# Patient Record
Sex: FEMALE | Race: WHITE | URBAN - METROPOLITAN AREA
[De-identification: names, ages, dates, MRNs, and addresses within clinical notes are randomized per-mention and may not be internally consistent; named-entity substitution may affect disease eponyms.]

---

## 2021-04-05 ENCOUNTER — HISTORICAL (OUTPATIENT)
Dept: ADMINISTRATIVE | Facility: HOSPITAL | Age: 41
End: 2021-04-05

## 2022-04-09 ENCOUNTER — HISTORICAL (OUTPATIENT)
Dept: ADMINISTRATIVE | Facility: HOSPITAL | Age: 42
End: 2022-04-09

## 2022-04-25 VITALS
SYSTOLIC BLOOD PRESSURE: 128 MMHG | WEIGHT: 257.94 LBS | DIASTOLIC BLOOD PRESSURE: 80 MMHG | HEIGHT: 62 IN | OXYGEN SATURATION: 99 % | BODY MASS INDEX: 47.47 KG/M2

## 2022-05-02 NOTE — HISTORICAL [facility]
This is a historical note converted from [employer]. Formatting and pictures may have been removed.  Please reference [employer] for original formatting and attached multimedia. Chief Complaint  lower back x 1 day, no know injury , pain started bending down  History of Present Illness  40-year-old female presents to clinic complaining of?low back pain?for the past 2 days. ?Patient states it all began when she bent down to zipper up her shoes.? Patient states she does have a history of back pain.? Is always in the same area?across the bottom of the back. ?Denies any radiation of the pain. ?Denies any weakness numbness or tingling in the legs. ?Denies any?bladder or bowel dysfunction. ?Denies any trauma injury or fall  Review of Systems  Constitutional: negative except as stated in HPI  ?Eye: negative except as stated in HPI  ?ENT: negative except as stated in HPI  ?Respiratory: negative except as stated in HPI  ?Cardiovascular: negative except as stated in HPI  ?Gastrointestinal: negative except as stated in HPI  ?Genitourinary: negative except as stated in HPI  ?Hema/Lymph: negative except as stated in HPI  ?Endocrine: negative except as stated in HPI  ?Immunologic: negative except as stated in HPI  ?Musculoskeletal: negative except as stated in HPI  ?Integumentary: negative except as stated in HPI  ?Neurologic: negative except as stated in HPI  ?All Other ROS_ negative except as stated in HPI  Physical Exam  Vitals & Measurements  T:?36.5? ?C (Oral)? HR:?101(Peripheral)? RR:?20? BP:?140/84? SpO2:?99%?  HT:?158.00?cm? WT:?116.190?kg? BMI:?46.54?  General_well-developed well-nourished in no acute distress  Musculoskeletal_tenderness to palpation along the?lumbar spine across?the back. ?Hypertonicity of the?paravertebral musculature.? 5 out of 5 strength in the bilateral lower extremities  Integumentary_no rashes or skin lesions present  Neurologic_ cranial nerves intact, no signs of peripheral neurological deficit,  motor/sensory function intact?  ?  Assessment/Plan  1.?Low back pain?M54.5  ?Medications sent to pharmacy.??Start the anti-inflammatory?tonight. ?Muscle relaxer and pain medication may cause drowsiness.? Do not take them at the same time. ?Wait at least 4 hours.? Do not take these medications if you take your Xanax?or sleeping medication temazepam.??Avoid heavy lifting.??Avoid?excessive bending or stooping?or strenuous activities.??Apply warm compresses to the affected area 3-4 times a day for 10 to 15 minutes.??If your symptoms persist or worsen seek medical attention immediately.? discussion was had with patient regarding?narcotic pain medications, muscle relaxers and sedatives.? Patient was advised to?avoid taking her Xanax?if she took the muscle relaxer or pain medication.? Patient was also advised?not to take her?sleeping medication?if she took the muscle relaxer or?pain medication at bedtime.??Discussed the dangers of?taking?pain medications with other sedatives?such as benzodiazepines.? Patient verbalized understanding.  Ordered:  acetaminophen-HYDROcodone, 1 tab(s), Oral, q6hr, PRN PRN as needed for pain, X 3 day(s), # 12 tab(s), 0 Refill(s), Pharmacy: [facility] #[id number], 158, cm, Height/Length Dosing, 04/05/21 8:21:00 CDT, 116.19, kg, Weight Dosing, 04/05/21 8:21:00 CDT  betamethasone, 12 mg, form: Injection, IM, Once-NOW, first dose 04/05/21 9:13:00 CDT, stop date 04/05/21 9:13:00 CDT  diclofenac, 75 mg = 1 tab(s), Oral, BID, # 14 tab(s), 0 Refill(s), Pharmacy: [facility] #[id number], 158, cm, Height/Length Dosing, 04/05/21 8:21:00 CDT, 116.19, kg, Weight Dosing, 04/05/21 8:21:00 CDT  ketorolac, 30 mg, form: Injection, IM, Once, first dose 04/05/21 10:00:00 CDT, stop date 04/05/21 10:00:00 CDT  methocarbamol, 1,500 mg = 2 tab(s), Oral, TID, PRN PRN prn muscle spasm, X 5 day(s), # 30 tab(s), 0 Refill(s), Pharmacy: [id number], 158, cm, Height/Length Dosing, 04/05/21 8:21:00  CDT, 116.19, kg, Weight Dosing, 21 8:21:00 CDT  XR Spine Lumbar 2 or 3 Views, Routine, 21 9:13:00 CDT, None, Ambulatory, Rad Type, Low back pain, Not Scheduled, 21 9:13:00 CDT  ?   Problem List/Past Medical History  Ongoing  Anxiety  Morbid obesity  Historical  No qualifying data  Procedure/Surgical History  wisdom teeth removal (2020)  Bilateral tubal ligation   section   Medications  acetaminophen-hydrocodone 325 mg-5 mg oral tablet, 1 tab(s), Oral, q6hr, PRN  alPRAzolam 0.25 mg oral tab, 0.25 mg= 1 tab(s), Oral, BID  busPIRone 5 mg oral tablet, 5 mg= 1 tab(s), Oral, TID  diclofenac sodium 75 mg oral delayed release tablet, 75 mg= 1 tab(s), Oral, BID  medroxyPROGESTERone 10 mg oral tablet, 10 mg= 1 tab(s), Oral, BID  methocarbamol 750 mg oral tablet, 1500 mg= 2 tab(s), Oral, TID, PRN  temazepam 30 mg oral capsule, 30 mg= 1 cap(s), Oral, qPM  Allergies  No Known Allergies  No Known Medication Allergies  Social History  Abuse/Neglect  No, 2021  No, 2021  Alcohol  Current, 1-2 times per month, 2021  Substance Use  Never, 2021  Tobacco  Never (less than 100 in lifetime), No, 2021  Family History  Family history is negative  Health Maintenance  Health Maintenance  ???Pending?(in the next year)  ??? ??OverDue  ??? ? ? ?Alcohol Misuse Screening due??21??and every 1??year(s)  ??? ??Due?  ??? ? ? ?Cervical Cancer Screening due??02/15/21??and every 3??year(s)  ??? ? ? ?ADL Screening due??21??and every 1??year(s)  ??? ? ? ?Tetanus Vaccine due??21??and every 10??year(s)  ??? ??Due In Future?  ??? ? ? ?Diabetes Screening not due until??21??and every 3??year(s)  ??? ? ? ?Obesity Screening not due until??22??and every 1??year(s)  ??? ? ? ?Depression Screening not due until??22??and every 1??year(s)  ???Satisfied?(in the past 1 year)  ??? ??Satisfied?  ??? ? ? ?Blood Pressure Screening on??21.??Satisfied by GIULIA SANTOS LPN  ALIYAH  ??? ? ? ?Body Mass Index Check on??04/05/21.??Satisfied by Soumya Case LPN  ??? ? ? ?Influenza Vaccine on??01/18/21.??Satisfied by Natalie Blackburn  ??? ? ? ?Obesity Screening on??04/05/21.??Satisfied by Soumya Case LPN  ?  Diagnostic Results  Degenerative changes

## 2024-09-03 ENCOUNTER — PATIENT OUTREACH (OUTPATIENT)
Dept: ADMINISTRATIVE | Facility: HOSPITAL | Age: 44
End: 2024-09-03

## 2024-09-03 LAB
LEFT EYE DM RETINOPATHY: NEGATIVE
RIGHT EYE DM RETINOPATHY: NEGATIVE

## 2024-10-08 ENCOUNTER — PATIENT OUTREACH (OUTPATIENT)
Dept: ADMINISTRATIVE | Facility: HOSPITAL | Age: 44
End: 2024-10-08

## 2024-10-08 LAB
ALBUMIN/CREAT UR: 117.5 UG/MG
GFR ESTIMATION: >60

## 2024-10-13 ENCOUNTER — TELEPHONE (OUTPATIENT)
Dept: PHARMACY | Facility: CLINIC | Age: 44
End: 2024-10-13

## 2024-10-13 RX ORDER — METFORMIN HYDROCHLORIDE 500 MG/1
500 TABLET ORAL 2 TIMES DAILY
COMMUNITY
Start: 2024-09-08

## 2024-10-14 NOTE — TELEPHONE ENCOUNTER
[facility]/Population Health Chart Review & Patient Outreach Details For Medication Adherence Project    Reason for Outreach Encounter: 3rd Party payor non-compliance report ([employer], [employer], [employer], etc)  2.  Patient Outreach Method: Reviewed patient chart   3.   Medication in question:   Diabetes Medications               metFORMIN (GLUCOPHAGE) 500 MG tablet Take 500 mg by mouth 2 (two) times daily.                 METFORMIN  last filled  9/8 for 90 day supply      4.  Reviewed and or Updates Made To: Patient Chart  5. Outreach Outcomes and/or actions taken: Patient filled medication and is on track to be adherent  Additional Notes:

## 2025-01-02 ENCOUNTER — TELEPHONE (OUTPATIENT)
Dept: PHARMACY | Facility: CLINIC | Age: 45
End: 2025-01-02
Payer: COMMERCIAL

## 2025-01-02 NOTE — TELEPHONE ENCOUNTER
[facility]Population [facility] Chart Review & Patient Outreach Details For Medication Adherence Project    Reason for Outreach Encounter: 3rd Party payor non-compliance report ([employer], [employer], [facility], etc)  2.  Patient Outreach Method: Reviewed patient chart  and Telephone call  3.   Medication in question:    Diabetes Medications               metFORMIN (GLUCOPHAGE) 500 MG tablet Take 500 mg by mouth 2 (two) times daily.                 Metformin  last filled  9/8/24 for 90 day supply      4.  Reviewed and or Updates Made To: Patient Chart  5. Outreach Outcomes and/or actions taken: Patient didn't answer. Closed encounter.  Additional Notes:

## 2025-01-04 ENCOUNTER — OFFICE VISIT (OUTPATIENT)
Dept: URGENT CARE | Facility: CLINIC | Age: 45
End: 2025-01-04
Payer: COMMERCIAL

## 2025-01-04 VITALS
SYSTOLIC BLOOD PRESSURE: 120 MMHG | HEIGHT: 62 IN | HEART RATE: 94 BPM | BODY MASS INDEX: 46.01 KG/M2 | TEMPERATURE: 98 F | OXYGEN SATURATION: 98 % | DIASTOLIC BLOOD PRESSURE: 86 MMHG | RESPIRATION RATE: 20 BRPM | WEIGHT: 250 LBS

## 2025-01-04 DIAGNOSIS — R52 BODY ACHES: ICD-10-CM

## 2025-01-04 DIAGNOSIS — R09.81 CONGESTED NOSE: Primary | ICD-10-CM

## 2025-01-04 LAB
CTP QC/QA: YES
POC MOLECULAR INFLUENZA A AGN: NEGATIVE
POC MOLECULAR INFLUENZA B AGN: NEGATIVE

## 2025-01-04 PROCEDURE — 99213 OFFICE O/P EST LOW 20 MIN: CPT | Mod: 25,,, | Performed by: CLINIC/CENTER

## 2025-01-04 PROCEDURE — 96372 THER/PROPH/DIAG INJ SC/IM: CPT | Mod: ,,, | Performed by: CLINIC/CENTER

## 2025-01-04 PROCEDURE — 87502 INFLUENZA DNA AMP PROBE: CPT | Mod: QW,,, | Performed by: CLINIC/CENTER

## 2025-01-04 RX ORDER — BETAMETHASONE SODIUM PHOSPHATE AND BETAMETHASONE ACETATE 3; 3 MG/ML; MG/ML
9 INJECTION, SUSPENSION INTRA-ARTICULAR; INTRALESIONAL; INTRAMUSCULAR; SOFT TISSUE
Status: COMPLETED | OUTPATIENT
Start: 2025-01-04 | End: 2025-01-04

## 2025-01-04 RX ORDER — ESCITALOPRAM OXALATE 10 MG/1
10 TABLET ORAL
COMMUNITY

## 2025-01-04 RX ORDER — LOSARTAN POTASSIUM 50 MG/1
50 TABLET ORAL
COMMUNITY

## 2025-01-04 RX ORDER — HYDROCHLOROTHIAZIDE 25 MG/1
25 TABLET ORAL
COMMUNITY

## 2025-01-04 RX ORDER — FEZOLINETANT 45 MG/1
TABLET, FILM COATED ORAL
COMMUNITY

## 2025-01-04 RX ORDER — ALPRAZOLAM 0.5 MG/1
0.5 TABLET ORAL 2 TIMES DAILY PRN
COMMUNITY

## 2025-01-04 RX ORDER — PANTOPRAZOLE SODIUM 40 MG/1
40 TABLET, DELAYED RELEASE ORAL
COMMUNITY

## 2025-01-04 RX ADMIN — BETAMETHASONE SODIUM PHOSPHATE AND BETAMETHASONE ACETATE 9 MG: 3; 3 INJECTION, SUSPENSION INTRA-ARTICULAR; INTRALESIONAL; INTRAMUSCULAR; SOFT TISSUE at 11:01

## 2025-01-04 NOTE — PROGRESS NOTES
"Subjective:      Patient ID: Elsie Lang is a 44 y.o. female.    Vitals:  height is 5' 2" (1.575 m) and weight is 113.4 kg (250 lb). Her tympanic temperature is 97.8 °F (36.6 °C). Her blood pressure is 120/86 and her pulse is 94. Her respiration is 20 and oxygen saturation is 98%.     Chief Complaint: Nasal Congestion    This is a 44-year-old female presents with a 3 day history of severe nasal congestion, ear pressure, and mild cough.  Patient reports that the cough is somewhat productive with yellow sputum.  Patient denies shortness of breath, sore throat, fever        Respiratory:  Positive for cough.       Objective:     Physical Exam   Constitutional: She is oriented to person, place, and time. She appears well-developed. She is cooperative.  Non-toxic appearance. She does not appear ill. No distress.   HENT:   Head: Normocephalic and atraumatic.   Ears:   Right Ear: Hearing, tympanic membrane, external ear and ear canal normal.   Left Ear: Hearing, tympanic membrane, external ear and ear canal normal.   Nose: Nose normal. No mucosal edema, rhinorrhea or nasal deformity. No epistaxis. Right sinus exhibits no maxillary sinus tenderness and no frontal sinus tenderness. Left sinus exhibits no maxillary sinus tenderness and no frontal sinus tenderness.   Mouth/Throat: Uvula is midline, oropharynx is clear and moist and mucous membranes are normal. No trismus in the jaw. Normal dentition. No uvula swelling. No oropharyngeal exudate, posterior oropharyngeal edema or posterior oropharyngeal erythema.   Eyes: Conjunctivae and lids are normal. No scleral icterus.   Neck: Trachea normal and phonation normal. Neck supple. No edema present. No erythema present. No neck rigidity present.   Cardiovascular: Normal rate, regular rhythm, normal heart sounds and normal pulses.   Pulmonary/Chest: Effort normal and breath sounds normal. No respiratory distress. She has no decreased breath sounds. She has no rhonchi. " "  Abdominal: Normal appearance.   Musculoskeletal: Normal range of motion.         General: No deformity. Normal range of motion.   Neurological: She is alert and oriented to person, place, and time. She exhibits normal muscle tone. Coordination normal.   Skin: Skin is warm, dry, intact, not diaphoretic and not pale.   Psychiatric: Her speech is normal and behavior is normal. Judgment and thought content normal.   Nursing note and vitals reviewed.         Previous History      Review of patient's allergies indicates:  No Known Allergies    Past Medical History:   Diagnosis Date    Anxiety     Diabetes mellitus, type 2     GERD (gastroesophageal reflux disease)     Hypertension      Current Outpatient Medications   Medication Instructions    ALPRAZolam (XANAX) 0.5 mg, 2 times daily PRN    EScitalopram oxalate (LEXAPRO) 10 mg    hydroCHLOROthiazide (HYDRODIURIL) 25 mg    losartan (COZAAR) 50 mg    metFORMIN (GLUCOPHAGE) 500 mg, 2 times daily    pantoprazole (PROTONIX) 40 mg    VEOZAH 45 mg Tab      Past Surgical History:   Procedure Laterality Date     SECTION  2009    HYSTERECTOMY      WISDOM TOOTH EXTRACTION       No family history on file.    Social History     Tobacco Use    Smoking status: Never    Smokeless tobacco: Never        Physical Exam      Vital Signs Reviewed   /86   Pulse 94   Temp 97.8 °F (36.6 °C) (Tympanic)   Resp 20   Ht 5' 2" (1.575 m)   Wt 113.4 kg (250 lb)   SpO2 98%   BMI 45.73 kg/m²        Procedures    Procedures     Labs     Results for orders placed or performed in visit on 25   POCT Influenza A/B Molecular    Collection Time: 25 11:20 AM   Result Value Ref Range    POC Molecular Influenza A Ag Negative Negative    POC Molecular Influenza B Ag Negative Negative     Acceptable Yes       Assessment:     1. Congested nose    2. Body aches        Plan:   Drink plenty of fluids.     Get plenty of rest.     Tylenol or Motrin as needed.     Go to the " ER with any significant change or worsening of symptoms.     Follow up with your primary care doctor.      Congested nose  -     betamethasone acetate-betamethasone sodium phosphate injection 9 mg    Body aches  -     POCT Influenza A/B Molecular  -     betamethasone acetate-betamethasone sodium phosphate injection 9 mg

## 2025-01-08 ENCOUNTER — TELEPHONE (OUTPATIENT)
Dept: PRIMARY CARE CLINIC | Facility: CLINIC | Age: 45
End: 2025-01-08

## 2025-01-08 ENCOUNTER — OFFICE VISIT (OUTPATIENT)
Dept: PRIMARY CARE CLINIC | Facility: CLINIC | Age: 45
End: 2025-01-08
Payer: COMMERCIAL

## 2025-01-08 VITALS
WEIGHT: 247.19 LBS | OXYGEN SATURATION: 98 % | DIASTOLIC BLOOD PRESSURE: 86 MMHG | HEART RATE: 88 BPM | BODY MASS INDEX: 45.49 KG/M2 | HEIGHT: 62 IN | SYSTOLIC BLOOD PRESSURE: 117 MMHG | RESPIRATION RATE: 15 BRPM | TEMPERATURE: 98 F

## 2025-01-08 DIAGNOSIS — E66.01 MORBID OBESITY: ICD-10-CM

## 2025-01-08 DIAGNOSIS — Z00.00 WELLNESS EXAMINATION: ICD-10-CM

## 2025-01-08 DIAGNOSIS — E11.69 TYPE 2 DIABETES MELLITUS WITH OTHER SPECIFIED COMPLICATION, UNSPECIFIED WHETHER LONG TERM INSULIN USE: ICD-10-CM

## 2025-01-08 DIAGNOSIS — I10 PRIMARY HYPERTENSION: Chronic | ICD-10-CM

## 2025-01-08 DIAGNOSIS — Z12.31 ENCOUNTER FOR SCREENING MAMMOGRAM FOR BREAST CANCER: Primary | ICD-10-CM

## 2025-01-08 DIAGNOSIS — Z11.59 ENCOUNTER FOR HEPATITIS C SCREENING TEST FOR LOW RISK PATIENT: ICD-10-CM

## 2025-01-08 DIAGNOSIS — Z76.89 ENCOUNTER TO ESTABLISH CARE WITH NEW DOCTOR: Primary | ICD-10-CM

## 2025-01-08 DIAGNOSIS — Z11.4 ENCOUNTER FOR SCREENING FOR HIV: ICD-10-CM

## 2025-01-08 DIAGNOSIS — E11.65 TYPE 2 DIABETES MELLITUS WITH HYPERGLYCEMIA, WITHOUT LONG-TERM CURRENT USE OF INSULIN: ICD-10-CM

## 2025-01-08 PROBLEM — F90.0 ATTENTION DEFICIT HYPERACTIVITY DISORDER (ADHD), PREDOMINANTLY INATTENTIVE TYPE: Status: ACTIVE | Noted: 2025-01-08

## 2025-01-08 PROBLEM — F90.0 ATTENTION DEFICIT HYPERACTIVITY DISORDER (ADHD), PREDOMINANTLY INATTENTIVE TYPE: Chronic | Status: ACTIVE | Noted: 2025-01-08

## 2025-01-08 PROBLEM — F41.1 GENERALIZED ANXIETY DISORDER: Chronic | Status: ACTIVE | Noted: 2025-01-08

## 2025-01-08 PROBLEM — K21.9 GASTROESOPHAGEAL REFLUX DISEASE: Status: ACTIVE | Noted: 2025-01-08

## 2025-01-08 PROBLEM — F41.1 GENERALIZED ANXIETY DISORDER: Status: ACTIVE | Noted: 2025-01-08

## 2025-01-08 LAB — HBA1C MFR BLD: 10.2 %

## 2025-01-08 PROCEDURE — 83036 HEMOGLOBIN GLYCOSYLATED A1C: CPT | Mod: QW,,, | Performed by: STUDENT IN AN ORGANIZED HEALTH CARE EDUCATION/TRAINING PROGRAM

## 2025-01-08 RX ORDER — EMPAGLIFLOZIN, METFORMIN HYDROCHLORIDE 25; 1000 MG/1; MG/1
1 TABLET, EXTENDED RELEASE ORAL DAILY
Qty: 90 TABLET | Refills: 3 | Status: SHIPPED | OUTPATIENT
Start: 2025-01-08 | End: 2025-01-08 | Stop reason: SDUPTHER

## 2025-01-08 RX ORDER — EMPAGLIFLOZIN, METFORMIN HYDROCHLORIDE 25; 1000 MG/1; MG/1
1 TABLET, EXTENDED RELEASE ORAL DAILY
Qty: 30 TABLET | Refills: 11 | Status: SHIPPED | OUTPATIENT
Start: 2025-01-08 | End: 2026-01-08

## 2025-01-08 NOTE — ASSESSMENT & PLAN NOTE
Most recent A1c was 10.2, goal A1c <7.0%   Start Synjardy XR, stop metformin IR 1000 mg BID  Start Ozempic 0.25 mg weekly, 0.5mg weekly for 4 doses, then 1 mg weekly for 4 doses  Repeat in 3 months adjust medications accordingly  Continue ACE-I and Statin   Continue ADA diet, Counseled on importance of diet & weight loss

## 2025-01-08 NOTE — PROGRESS NOTES
Subjective:       Patient ID: Elsie Lang is a 44 y.o. female.    -------------------------------------    Anxiety    Diabetes mellitus, type 2    GERD (gastroesophageal reflux disease)    Hypertension      Chief Complaint: Establish Care    Presents to the clinic to establish care. Was feeling bad, tired, and lightheaded. Checked her sugar with her mother-in-law glucometer and it was in the 400s. Consistently been in the 200-300s range. Last year her A1c was 6.0, pre-diabetic. On metformin 1000mg BID, admits that she was only taking 1000mg daily due to GI side effects. POC A1c was 10.2.       Review of Systems   Constitutional:  Positive for fatigue. Negative for chills and fever.   Respiratory:  Negative for cough.    Cardiovascular:  Negative for chest pain.   Gastrointestinal:  Negative for abdominal pain and vomiting.   Genitourinary:  Negative for dysuria and hematuria.   Neurological:  Positive for light-headedness.   Psychiatric/Behavioral:  Negative for depressed mood.          Objective:      Physical Exam  Vitals and nursing note reviewed.   Constitutional:       General: She is not in acute distress.     Appearance: Normal appearance. She is not ill-appearing.   HENT:      Head: Normocephalic.      Nose: Nose normal. No rhinorrhea.      Mouth/Throat:      Mouth: Mucous membranes are moist.   Eyes:      General: No scleral icterus.     Conjunctiva/sclera: Conjunctivae normal.   Cardiovascular:      Rate and Rhythm: Normal rate and regular rhythm.   Pulmonary:      Effort: Pulmonary effort is normal. No respiratory distress.   Musculoskeletal:         General: No deformity.      Right lower leg: No edema.      Left lower leg: No edema.   Skin:     General: Skin is warm and dry.      Coloration: Skin is not jaundiced.   Neurological:      Mental Status: She is alert and oriented to person, place, and time. Mental status is at baseline.      Gait: Gait normal.   Psychiatric:         Mood and Affect:  Mood normal.         Behavior: Behavior normal.         Assessment & Plan:   1. Encounter to establish care with new doctor  Comments:  Reviewed medical history and reconciled medications   Ordered Wellness Labs   Requested records from previous provider/specialists  Orders:  -     CBC Auto Differential; Future; Expected date: 04/08/2025  -     Comprehensive Metabolic Panel; Future; Expected date: 04/08/2025  -     Urinalysis; Future; Expected date: 04/08/2025  -     TSH; Future; Expected date: 04/08/2025  -     Lipid Panel; Future; Expected date: 04/08/2025  -     Hemoglobin A1C; Future; Expected date: 04/08/2025    2. Type 2 diabetes mellitus with hyperglycemia, without long-term current use of insulin  Assessment & Plan:  Most recent A1c was 10.2, goal A1c <7.0%   Start Synjardy XR, stop metformin IR 1000 mg BID  Start Ozempic 0.25 mg weekly, 0.5mg weekly for 4 doses, then 1 mg weekly for 4 doses  Repeat in 3 months adjust medications accordingly  Continue ACE-I and Statin   Continue ADA diet, Counseled on importance of diet & weight loss   Orders:  -     semaglutide (OZEMPIC) 1 mg/dose (4 mg/3 mL); Inject 1 mg into the skin every 7 days.  Dispense: 3 mL; Refill: 11  -     empagliflozin-metformin (SYNJARDY XR) 25-1,000 mg TBph; Take 1 tablet by mouth Daily.  Dispense: 30 tablet; Refill: 11  -     CBC Auto Differential; Future; Expected date: 04/08/2025  -     Comprehensive Metabolic Panel; Future; Expected date: 04/08/2025  -     Urinalysis; Future; Expected date: 04/08/2025  -     TSH; Future; Expected date: 04/08/2025  -     Lipid Panel; Future; Expected date: 04/08/2025  -     Hemoglobin A1C; Future; Expected date: 04/08/2025  -     Microalbumin/Creatinine Ratio, Urine; Future; Expected date: 04/08/2025    3. Primary hypertension  Assessment & Plan:  Today's BP WNL  Continue Losartan, HCTZ  Counseled on low sodium diet, exercise, tobacco avoidance, and limiting alcohol intake   Pt. Has home BP cuff, instructed  to measure home BP and report abnormal values     Orders:  -     CBC Auto Differential; Future; Expected date: 04/08/2025  -     Comprehensive Metabolic Panel; Future; Expected date: 04/08/2025  -     Urinalysis; Future; Expected date: 04/08/2025  -     TSH; Future; Expected date: 04/08/2025  -     Lipid Panel; Future; Expected date: 04/08/2025  -     Hemoglobin A1C; Future; Expected date: 04/08/2025    4. Morbid obesity  Assessment & Plan:  Encouraged healthy lifestyle/diet modifications   Exercise 3-5x a week (>30 minutes, including a variety of cardio, weightbearing and lifting exercises)   Eat a well balanced diet comprised of fruit/vegetables, lean protein, and complex carbs  Start Ozempic for both glycemic and weight loss control    Orders:  -     CBC Auto Differential; Future; Expected date: 04/08/2025  -     Comprehensive Metabolic Panel; Future; Expected date: 04/08/2025  -     Urinalysis; Future; Expected date: 04/08/2025  -     TSH; Future; Expected date: 04/08/2025  -     Lipid Panel; Future; Expected date: 04/08/2025  -     Hemoglobin A1C; Future; Expected date: 04/08/2025    5. Wellness examination  -     CBC Auto Differential; Future; Expected date: 04/08/2025  -     Comprehensive Metabolic Panel; Future; Expected date: 04/08/2025  -     Urinalysis; Future; Expected date: 04/08/2025  -     TSH; Future; Expected date: 04/08/2025  -     Lipid Panel; Future; Expected date: 04/08/2025  -     Hemoglobin A1C; Future; Expected date: 04/08/2025  -     Microalbumin/Creatinine Ratio, Urine; Future; Expected date: 04/08/2025  -     HIV 1/2 Ag/Ab (4th Gen); Future; Expected date: 04/08/2025  -     Hepatitis C Antibody; Future; Expected date: 04/08/2025    6. Encounter for screening for HIV  -     HIV 1/2 Ag/Ab (4th Gen); Future; Expected date: 04/08/2025    7. Encounter for hepatitis C screening test for low risk patient  -     Hepatitis C Antibody; Future; Expected date: 04/08/2025    Follow up in about 3 months  (around 4/8/2025) for Wellness, Diabetes, HTN. In addition to their scheduled follow up, the patient has also been instructed to follow up on as needed basis.

## 2025-01-08 NOTE — ASSESSMENT & PLAN NOTE
Encouraged healthy lifestyle/diet modifications   Exercise 3-5x a week (>30 minutes, including a variety of cardio, weightbearing and lifting exercises)   Eat a well balanced diet comprised of fruit/vegetables, lean protein, and complex carbs  Start Ozempic for both glycemic and weight loss control

## 2025-01-08 NOTE — ASSESSMENT & PLAN NOTE
Today's BP WNL  Continue Losartan, HCTZ  Counseled on low sodium diet, exercise, tobacco avoidance, and limiting alcohol intake   Pt. Has home BP cuff, instructed to measure home BP and report abnormal values

## 2025-01-08 NOTE — TELEPHONE ENCOUNTER
----- Message from Lynne sent at 1/8/2025 11:29 AM CST -----  Regarding: pharmacy call  Who Called: Lizzette pharmacist at [facility]     Pharmacy is calling to request assistance with Rx      Pharmacy name and phone number: [facility]  Pharmacy contact:   Patient Name:   Prescription Name: semaglutide (OZEMPIC) 1 mg/dose (4 mg/3 mL   What do they need to clarify?:dose -           Patient's Preferred Phone Number on File: [phone]   Best Call Back Number, if different: [phone]  Additional Information: pt was prescribed medication, pharmacist stated that its a new med for the pt and that it usually starts with a lower dose of 0.25 x week; but 1 mg x week was rx. Need to clarify if pt is to start on the 1 mg x week dose or 0.25 mg

## 2025-01-08 NOTE — TELEPHONE ENCOUNTER
Spoke with Lizzette the pharmacist and she verbalized understanding. She will make a note in the patient's chart.

## 2025-01-08 NOTE — TELEPHONE ENCOUNTER
Patient was given 0.25mg samples and 0.5mg samples in the office. Sent the 1mg to the pharmacy. Patient was informed at visit to start with 0.25 mg weekly, 0.5mg weekly for 4 doses prior to starting her 1mg prescription.     Valery Henry MD

## 2025-02-04 ENCOUNTER — TELEPHONE (OUTPATIENT)
Dept: PRIMARY CARE CLINIC | Facility: CLINIC | Age: 45
End: 2025-02-04
Payer: COMMERCIAL

## 2025-02-04 DIAGNOSIS — E11.65 TYPE 2 DIABETES MELLITUS WITH HYPERGLYCEMIA, WITHOUT LONG-TERM CURRENT USE OF INSULIN: Primary | Chronic | ICD-10-CM

## 2025-02-04 RX ORDER — METFORMIN HYDROCHLORIDE EXTENDED-RELEASE TABLETS 1000 MG/1
1000 TABLET, FILM COATED, EXTENDED RELEASE ORAL
Qty: 90 TABLET | Refills: 3 | Status: SHIPPED | OUTPATIENT
Start: 2025-02-04 | End: 2026-02-04

## 2025-02-04 NOTE — TELEPHONE ENCOUNTER
Spoke with Pt about options regarding her SYNJARDY  mg costs and alternatives, let Pt know that I will be putting in a order for metformin and jardiance as separate medications.

## 2025-02-04 NOTE — TELEPHONE ENCOUNTER
----- Message from Tegan sent at 2/3/2025 12:25 PM CST -----  Who Called: Elsie Lang    Caller is requesting assistance/information from provider's office.    Symptoms (please be specific): n/a   How long has patient had these symptoms:  n/a  List of preferred pharmacies on file (remove unneeded): [unfilled]  If different, enter pharmacy into here including location and phone number: n/a      Preferred Method of Contact: Phone Call  Patient's Preferred Phone Number on File: [phone]   Best Call Back Number, if different:  Additional Information: pt advise, pt called to discuss medication:  empagliflozin-metformin (SYNJARDY XR) 25-1,000 mg TBph 30 tablet, pt stated medication is too costly over $1,000.00 for 3m supply, please advise, thanks

## 2025-02-06 ENCOUNTER — TELEPHONE (OUTPATIENT)
Dept: PRIMARY CARE CLINIC | Facility: CLINIC | Age: 45
End: 2025-02-06
Payer: COMMERCIAL

## 2025-02-06 DIAGNOSIS — E11.65 TYPE 2 DIABETES MELLITUS WITH HYPERGLYCEMIA, WITHOUT LONG-TERM CURRENT USE OF INSULIN: Primary | ICD-10-CM

## 2025-02-06 RX ORDER — GLIMEPIRIDE 4 MG/1
4 TABLET ORAL 2 TIMES DAILY
Qty: 180 TABLET | Refills: 3 | Status: SHIPPED | OUTPATIENT
Start: 2025-02-06 | End: 2026-02-06

## 2025-02-06 NOTE — TELEPHONE ENCOUNTER
----- Message from Rupa sent at 2/5/2025  9:51 AM CST -----  Who Called: Candice    Caller is requesting assistance/information from provider's office.    Symptoms (please be specific): n/a   How long has patient had these symptoms:  n/a  List of preferred pharmacies on file (remove unneeded): [facility] - [place], [place] - [address]        Preferred Method of Contact: Phone Call  Patient's Preferred Phone Number on File: [phone]   Best Call Back Number, if different:[phone]  Additional Information: Would like to change metFORMIN (FORTAMET) 1,000 mg 24hr tablet to metformin  mg and have pt take two tablets once a day.

## 2025-02-06 NOTE — TELEPHONE ENCOUNTER
Spoke with Lizzette from [facility] pharmacy and gave her a verbal on changing the patient's metformin to 500 mg, taking 2 tablets a day before breakfast.

## 2025-02-06 NOTE — TELEPHONE ENCOUNTER
Spoke with the patient and notified her in place of the Jardiance we sent in Amaryl 4 mg to take BID. Patient verbalized understanding.

## 2025-02-06 NOTE — TELEPHONE ENCOUNTER
----- Message from Brianne sent at 2/5/2025  3:36 PM CST -----  Who Called: Elsie Lang    Caller is requesting assistance/information from provider's office.    Symptoms (please be specific):    How long has patient had these symptoms:    List of preferred pharmacies on file (remove unneeded): [unfilled]  If different, enter pharmacy into here including location and phone number:       Preferred Method of Contact: Phone Call  Patient's Preferred Phone Number on File: [phone]   Best Call Back Number, if different:  Additional Information: Pt is requesting a cb regarding medications that were called into pharmacy yesterday states medication is too expensive.

## 2025-03-05 ENCOUNTER — TELEPHONE (OUTPATIENT)
Dept: PRIMARY CARE CLINIC | Facility: CLINIC | Age: 45
End: 2025-03-05
Payer: COMMERCIAL

## 2025-03-05 NOTE — TELEPHONE ENCOUNTER
Called Pt to get more information about new side effects from OZEMPIC increase LVM as there was no answer.

## 2025-03-05 NOTE — TELEPHONE ENCOUNTER
----- Message from Susi sent at 3/5/2025 10:18 AM CST -----  Regarding: call back  .Who Called: Elsie Kathleen is requesting assistance/information from provider's office.Symptoms (please be specific):  How long has patient had these symptoms:  List of preferred pharmacies on file (remove unneeded): [unfilled]If different, enter pharmacy into here including location and phone number: Preferred Method of Contact: Phone CallPatient's Preferred Phone Number on File: [phone] Best Call Back Number, if different:Additional Information: pt requesting a call back about med  semaglutide (OZEMPIC) 1 mg/dose (4 mg/3 mL) her dose increased and her lows are low needs advice

## 2025-03-07 ENCOUNTER — TELEPHONE (OUTPATIENT)
Dept: PRIMARY CARE CLINIC | Facility: CLINIC | Age: 45
End: 2025-03-07
Payer: COMMERCIAL

## 2025-03-07 DIAGNOSIS — R11.2 NAUSEA AND VOMITING, UNSPECIFIED VOMITING TYPE: Primary | ICD-10-CM

## 2025-03-07 RX ORDER — ONDANSETRON 4 MG/1
TABLET, ORALLY DISINTEGRATING ORAL
Qty: 60 TABLET | Refills: 1 | Status: SHIPPED | OUTPATIENT
Start: 2025-03-07

## 2025-03-07 NOTE — TELEPHONE ENCOUNTER
Spoke with pt in regards to side effects and blood sugar dropping, pt verbalized she was eating with Amaryl (protein shake and sausage marli), educated pt that at every increase of medication injection GI and Nausea can worsen do the body adjusting and that food needs to be taken with Amaryl and that what she ate may not of been enough for the medication Pt verbalized understanding. Scheduled a A1C check on 3/10/25. Pt confirmed. Reminded pt that last A1c was 10.2 in January and the goal is 7 or lower. As well as 100 or lower for fasting blood sugar test in the morning. Will send in zofran odt for nausea and vomiting. Will send [employer] message about if sugar continue to drop with increase in food that Dr. Hnery okayed to cut Amaryl 4mg in half and to take 2 mg BID.

## 2025-03-10 ENCOUNTER — TELEPHONE (OUTPATIENT)
Dept: PRIMARY CARE CLINIC | Facility: CLINIC | Age: 45
End: 2025-03-10

## 2025-03-10 ENCOUNTER — CLINICAL SUPPORT (OUTPATIENT)
Dept: PRIMARY CARE CLINIC | Facility: CLINIC | Age: 45
End: 2025-03-10
Payer: COMMERCIAL

## 2025-03-10 DIAGNOSIS — E11.65 TYPE 2 DIABETES MELLITUS WITH HYPERGLYCEMIA, WITHOUT LONG-TERM CURRENT USE OF INSULIN: Primary | Chronic | ICD-10-CM

## 2025-03-10 LAB — HBA1C MFR BLD: 7 %

## 2025-03-10 NOTE — TELEPHONE ENCOUNTER
Spoke with Pt regarding the results of her A1c Nurse Visit which resulted as Hemoglobin A1c of 7.0%. Per Dr. Henry sghe recommends a dose adjustment on the Amarly to go down to a half tablets (2 mg) BID. Pt verbalized Understanding.

## 2025-03-10 NOTE — PROGRESS NOTES
Pt came into clinic for A1c Check. No changs to medications, medical history. No comments or concerns from previous complaint which prompted the nurse visit (dizziness, nausea, light-headedness)  POCT A1c order was placed and sample was taken. Pt verbalized understanding and thanks.

## 2025-04-04 ENCOUNTER — RESULTS FOLLOW-UP (OUTPATIENT)
Dept: PRIMARY CARE CLINIC | Facility: CLINIC | Age: 45
End: 2025-04-04

## 2025-04-10 DIAGNOSIS — F41.1 GENERALIZED ANXIETY DISORDER: Primary | Chronic | ICD-10-CM

## 2025-04-10 DIAGNOSIS — K21.9 GASTROESOPHAGEAL REFLUX DISEASE, UNSPECIFIED WHETHER ESOPHAGITIS PRESENT: ICD-10-CM

## 2025-04-10 RX ORDER — PANTOPRAZOLE SODIUM 40 MG/1
40 TABLET, DELAYED RELEASE ORAL DAILY
Qty: 90 TABLET | Refills: 1 | Status: SHIPPED | OUTPATIENT
Start: 2025-04-10

## 2025-04-10 RX ORDER — ESCITALOPRAM OXALATE 10 MG/1
10 TABLET ORAL DAILY
Qty: 90 TABLET | Refills: 1 | Status: SHIPPED | OUTPATIENT
Start: 2025-04-10

## 2025-04-17 ENCOUNTER — LAB VISIT (OUTPATIENT)
Dept: LAB | Facility: HOSPITAL | Age: 45
End: 2025-04-17
Attending: STUDENT IN AN ORGANIZED HEALTH CARE EDUCATION/TRAINING PROGRAM
Payer: COMMERCIAL

## 2025-04-17 DIAGNOSIS — I10 PRIMARY HYPERTENSION: ICD-10-CM

## 2025-04-17 DIAGNOSIS — Z11.4 ENCOUNTER FOR SCREENING FOR HIV: ICD-10-CM

## 2025-04-17 DIAGNOSIS — E11.65 TYPE 2 DIABETES MELLITUS WITH HYPERGLYCEMIA, WITHOUT LONG-TERM CURRENT USE OF INSULIN: ICD-10-CM

## 2025-04-17 DIAGNOSIS — Z76.89 ENCOUNTER TO ESTABLISH CARE WITH NEW DOCTOR: ICD-10-CM

## 2025-04-17 DIAGNOSIS — Z11.59 ENCOUNTER FOR HEPATITIS C SCREENING TEST FOR LOW RISK PATIENT: ICD-10-CM

## 2025-04-17 DIAGNOSIS — Z00.00 WELLNESS EXAMINATION: ICD-10-CM

## 2025-04-17 DIAGNOSIS — E66.01 MORBID OBESITY: ICD-10-CM

## 2025-04-17 LAB
ALBUMIN SERPL-MCNC: 3.9 G/DL (ref 3.5–5)
ALBUMIN/GLOB SERPL: 1.1 RATIO (ref 1.1–2)
ALP SERPL-CCNC: 89 UNIT/L (ref 40–150)
ALT SERPL-CCNC: 39 UNIT/L (ref 0–55)
ANION GAP SERPL CALC-SCNC: 6 MEQ/L
AST SERPL-CCNC: 27 UNIT/L (ref 11–45)
BASOPHILS # BLD AUTO: 0.03 X10(3)/MCL
BASOPHILS NFR BLD AUTO: 0.4 %
BILIRUB SERPL-MCNC: 0.5 MG/DL
BUN SERPL-MCNC: 17.9 MG/DL (ref 7–18.7)
CALCIUM SERPL-MCNC: 9.7 MG/DL (ref 8.4–10.2)
CHLORIDE SERPL-SCNC: 101 MMOL/L (ref 98–107)
CHOLEST SERPL-MCNC: 175 MG/DL
CHOLEST/HDLC SERPL: 5 {RATIO} (ref 0–5)
CO2 SERPL-SCNC: 33 MMOL/L (ref 22–29)
CREAT SERPL-MCNC: 0.93 MG/DL (ref 0.55–1.02)
CREAT/UREA NIT SERPL: 19
EOSINOPHIL # BLD AUTO: 0.07 X10(3)/MCL (ref 0–0.9)
EOSINOPHIL NFR BLD AUTO: 0.9 %
ERYTHROCYTE [DISTWIDTH] IN BLOOD BY AUTOMATED COUNT: 13.8 % (ref 11.5–17)
EST. AVERAGE GLUCOSE BLD GHB EST-MCNC: 111.2 MG/DL
GFR SERPLBLD CREATININE-BSD FMLA CKD-EPI: >60 ML/MIN/1.73/M2
GLOBULIN SER-MCNC: 3.5 GM/DL (ref 2.4–3.5)
GLUCOSE SERPL-MCNC: 95 MG/DL (ref 74–100)
HBA1C MFR BLD: 5.5 %
HCT VFR BLD AUTO: 42.2 % (ref 37–47)
HDLC SERPL-MCNC: 38 MG/DL (ref 35–60)
HGB BLD-MCNC: 13.5 G/DL (ref 12–16)
IMM GRANULOCYTES # BLD AUTO: 0.02 X10(3)/MCL (ref 0–0.04)
IMM GRANULOCYTES NFR BLD AUTO: 0.3 %
LDLC SERPL CALC-MCNC: 101 MG/DL (ref 50–140)
LYMPHOCYTES # BLD AUTO: 2.67 X10(3)/MCL (ref 0.6–4.6)
LYMPHOCYTES NFR BLD AUTO: 35.4 %
MCH RBC QN AUTO: 30 PG (ref 27–31)
MCHC RBC AUTO-ENTMCNC: 32 G/DL (ref 33–36)
MCV RBC AUTO: 93.8 FL (ref 80–94)
MONOCYTES # BLD AUTO: 0.6 X10(3)/MCL (ref 0.1–1.3)
MONOCYTES NFR BLD AUTO: 7.9 %
NEUTROPHILS # BLD AUTO: 4.16 X10(3)/MCL (ref 2.1–9.2)
NEUTROPHILS NFR BLD AUTO: 55.1 %
NRBC BLD AUTO-RTO: 0 %
PLATELET # BLD AUTO: 260 X10(3)/MCL (ref 130–400)
PMV BLD AUTO: 10.2 FL (ref 7.4–10.4)
POTASSIUM SERPL-SCNC: 4.2 MMOL/L (ref 3.5–5.1)
PROT SERPL-MCNC: 7.4 GM/DL (ref 6.4–8.3)
RBC # BLD AUTO: 4.5 X10(6)/MCL (ref 4.2–5.4)
SODIUM SERPL-SCNC: 140 MMOL/L (ref 136–145)
TRIGL SERPL-MCNC: 182 MG/DL (ref 37–140)
TSH SERPL-ACNC: 1.01 UIU/ML (ref 0.35–4.94)
VLDLC SERPL CALC-MCNC: 36 MG/DL
WBC # BLD AUTO: 7.55 X10(3)/MCL (ref 4.5–11.5)

## 2025-04-17 PROCEDURE — 87389 HIV-1 AG W/HIV-1&-2 AB AG IA: CPT

## 2025-04-17 PROCEDURE — 85025 COMPLETE CBC W/AUTO DIFF WBC: CPT

## 2025-04-17 PROCEDURE — 83036 HEMOGLOBIN GLYCOSYLATED A1C: CPT

## 2025-04-17 PROCEDURE — 84443 ASSAY THYROID STIM HORMONE: CPT

## 2025-04-17 PROCEDURE — 36415 COLL VENOUS BLD VENIPUNCTURE: CPT

## 2025-04-17 PROCEDURE — 86803 HEPATITIS C AB TEST: CPT

## 2025-04-17 PROCEDURE — 80053 COMPREHEN METABOLIC PANEL: CPT

## 2025-04-17 PROCEDURE — 80061 LIPID PANEL: CPT

## 2025-04-18 LAB
HCV AB SERPL QL IA: NONREACTIVE
HIV 1+2 AB+HIV1 P24 AG SERPL QL IA: NONREACTIVE

## 2025-04-20 ENCOUNTER — RESULTS FOLLOW-UP (OUTPATIENT)
Dept: PRIMARY CARE CLINIC | Facility: CLINIC | Age: 45
End: 2025-04-20

## 2025-04-21 ENCOUNTER — RESULTS FOLLOW-UP (OUTPATIENT)
Dept: PRIMARY CARE CLINIC | Facility: CLINIC | Age: 45
End: 2025-04-21

## 2025-04-21 DIAGNOSIS — Z00.00 WELL ADULT EXAM: Primary | ICD-10-CM

## 2025-04-21 DIAGNOSIS — E11.65 TYPE 2 DIABETES MELLITUS WITH HYPERGLYCEMIA, WITHOUT LONG-TERM CURRENT USE OF INSULIN: Chronic | ICD-10-CM

## 2025-04-24 ENCOUNTER — OFFICE VISIT (OUTPATIENT)
Dept: PRIMARY CARE CLINIC | Facility: CLINIC | Age: 45
End: 2025-04-24
Payer: COMMERCIAL

## 2025-04-24 VITALS
HEIGHT: 62 IN | HEART RATE: 84 BPM | BODY MASS INDEX: 40.48 KG/M2 | OXYGEN SATURATION: 98 % | WEIGHT: 220 LBS | SYSTOLIC BLOOD PRESSURE: 115 MMHG | RESPIRATION RATE: 15 BRPM | DIASTOLIC BLOOD PRESSURE: 80 MMHG

## 2025-04-24 DIAGNOSIS — E11.9 TYPE 2 DIABETES MELLITUS WITHOUT COMPLICATION, WITHOUT LONG-TERM CURRENT USE OF INSULIN: ICD-10-CM

## 2025-04-24 DIAGNOSIS — R11.2 NAUSEA AND VOMITING, UNSPECIFIED VOMITING TYPE: ICD-10-CM

## 2025-04-24 DIAGNOSIS — I10 PRIMARY HYPERTENSION: Chronic | ICD-10-CM

## 2025-04-24 DIAGNOSIS — E66.01 MORBID OBESITY: Chronic | ICD-10-CM

## 2025-04-24 DIAGNOSIS — Z00.00 WELLNESS EXAMINATION: Primary | Chronic | ICD-10-CM

## 2025-04-24 PROCEDURE — 3074F SYST BP LT 130 MM HG: CPT | Mod: CPTII,,, | Performed by: STUDENT IN AN ORGANIZED HEALTH CARE EDUCATION/TRAINING PROGRAM

## 2025-04-24 PROCEDURE — 4010F ACE/ARB THERAPY RXD/TAKEN: CPT | Mod: CPTII,,, | Performed by: STUDENT IN AN ORGANIZED HEALTH CARE EDUCATION/TRAINING PROGRAM

## 2025-04-24 PROCEDURE — 3044F HG A1C LEVEL LT 7.0%: CPT | Mod: CPTII,,, | Performed by: STUDENT IN AN ORGANIZED HEALTH CARE EDUCATION/TRAINING PROGRAM

## 2025-04-24 PROCEDURE — 99396 PREV VISIT EST AGE 40-64: CPT | Mod: ,,, | Performed by: STUDENT IN AN ORGANIZED HEALTH CARE EDUCATION/TRAINING PROGRAM

## 2025-04-24 PROCEDURE — 3008F BODY MASS INDEX DOCD: CPT | Mod: CPTII,,, | Performed by: STUDENT IN AN ORGANIZED HEALTH CARE EDUCATION/TRAINING PROGRAM

## 2025-04-24 PROCEDURE — 3060F POS MICROALBUMINURIA REV: CPT | Mod: CPTII,,, | Performed by: STUDENT IN AN ORGANIZED HEALTH CARE EDUCATION/TRAINING PROGRAM

## 2025-04-24 PROCEDURE — 1160F RVW MEDS BY RX/DR IN RCRD: CPT | Mod: CPTII,,, | Performed by: STUDENT IN AN ORGANIZED HEALTH CARE EDUCATION/TRAINING PROGRAM

## 2025-04-24 PROCEDURE — 1159F MED LIST DOCD IN RCRD: CPT | Mod: CPTII,,, | Performed by: STUDENT IN AN ORGANIZED HEALTH CARE EDUCATION/TRAINING PROGRAM

## 2025-04-24 PROCEDURE — 3079F DIAST BP 80-89 MM HG: CPT | Mod: CPTII,,, | Performed by: STUDENT IN AN ORGANIZED HEALTH CARE EDUCATION/TRAINING PROGRAM

## 2025-04-24 PROCEDURE — 3066F NEPHROPATHY DOC TX: CPT | Mod: CPTII,,, | Performed by: STUDENT IN AN ORGANIZED HEALTH CARE EDUCATION/TRAINING PROGRAM

## 2025-04-24 RX ORDER — ROSUVASTATIN CALCIUM 5 MG/1
5 TABLET, COATED ORAL DAILY
Qty: 90 TABLET | Refills: 3 | Status: SHIPPED | OUTPATIENT
Start: 2025-04-24 | End: 2026-04-24

## 2025-04-24 RX ORDER — TIRZEPATIDE 5 MG/.5ML
5 INJECTION, SOLUTION SUBCUTANEOUS
Qty: 2 ML | Refills: 0 | Status: SHIPPED | OUTPATIENT
Start: 2025-04-24 | End: 2025-05-24

## 2025-04-24 RX ORDER — ONDANSETRON 4 MG/1
TABLET, ORALLY DISINTEGRATING ORAL
Qty: 60 TABLET | Refills: 1 | Status: SHIPPED | OUTPATIENT
Start: 2025-04-24

## 2025-04-24 RX ORDER — GLIMEPIRIDE 2 MG/1
2 TABLET ORAL
Qty: 90 TABLET | Refills: 3 | Status: SHIPPED | OUTPATIENT
Start: 2025-04-24 | End: 2026-04-24

## 2025-04-24 RX ORDER — METFORMIN HYDROCHLORIDE 500 MG/1
1000 TABLET, EXTENDED RELEASE ORAL EVERY MORNING
COMMUNITY
Start: 2025-02-06

## 2025-04-24 NOTE — ASSESSMENT & PLAN NOTE
Health Maintenance:  Routine Health Maintenance   Exercise as tolerated, >30 minutes a day for 3-5 days a week.  Counseled patient on compliance with medications and healthy diet.     Age-Appropriate Screening  Vaccinations:    - Flu: Out of season, postponed, patient's preference    - Pneumonia: N/A   - Shingles (>50):N/A   - Tdap:UTD, due 2029   - COVID: Discontinued, patient's preference     Screening:   - Pap Smear (21-65):Hysterectomy   - Mammogram (40-50 discuss w/ pt, all >50):UTD, due in March    - Osteoporosis (all>65, sooner if risk factors):N/A   - Lung Ca. Screening (50-80 if >20 pack yrs current or quit <15 yrs):N/A   - Colon Ca. Screening (age >45): N/A   - Hep. C, HIV: Negative

## 2025-04-24 NOTE — PROGRESS NOTES
ANNUAL WELLNESS NOTE    Chief Complaint:  Wellness     HPI:  Elsie Lang is a 44 y.o. female    -------------------------------------    Anxiety    Diabetes mellitus, type 2    GERD (gastroesophageal reflux disease)    Hypertension     Patient Care Team:  Valery Henry MD as PCP - General (Internal Medicine)  Duglas Melvin MD (Obstetrics and Gynecology)    Presents today for wellness visit. Describes overall health as good. Diet is not healthy - work in progress. Exercises 3-4 times per week. Tobacco use: never. Alcohol use: rare (special occasion). Caffeine intake: 1 caffeinated drink daily. Eye exam: annually (wears glasses). Dental exam: twice annually. Having nausea/vomiting 12-24 hours after taking the Ozempic. Affecting her job because she has had to call in sick so many times recently. No further hypoglycemic episodes.  Would like to see if Mounjaro is better tolerated. Reviewed labs, answered all questions and concerns at this time.     Review of Systems   Constitutional:  Negative for chills and fever.   Respiratory:  Negative for cough.    Cardiovascular:  Negative for chest pain.   Gastrointestinal:  Positive for nausea and vomiting. Negative for abdominal pain.   Genitourinary:  Negative for dysuria and hematuria.   Psychiatric/Behavioral:  Negative for dysphoric mood.      Physical Exam  Vitals and nursing note reviewed.   Constitutional:       General: She is not in acute distress.     Appearance: Normal appearance. She is not ill-appearing.   HENT:      Head: Normocephalic.      Nose: Nose normal. No rhinorrhea.      Mouth/Throat:      Mouth: Mucous membranes are moist.   Eyes:      General: No scleral icterus.     Conjunctiva/sclera: Conjunctivae normal.   Cardiovascular:      Rate and Rhythm: Normal rate and regular rhythm.   Pulmonary:      Effort: Pulmonary effort is normal. No respiratory distress.   Musculoskeletal:         General: No deformity.   Skin:     General:  Skin is warm and dry.      Coloration: Skin is not jaundiced.   Neurological:      Mental Status: She is alert and oriented to person, place, and time. Mental status is at baseline.      Gait: Gait normal.   Psychiatric:         Mood and Affect: Mood normal.         Behavior: Behavior normal.       Assessment/Plan:  1. Wellness examination  Assessment & Plan:  Health Maintenance:  Routine Health Maintenance   Exercise as tolerated, >30 minutes a day for 3-5 days a week.  Counseled patient on compliance with medications and healthy diet.     Age-Appropriate Screening  Vaccinations:    - Flu: Out of season, postponed, patient's preference    - Pneumonia: N/A   - Shingles (>50):N/A   - Tdap:UTD, due 2029   - COVID: Discontinued, patient's preference     Screening:   - Pap Smear (21-65):Hysterectomy   - Mammogram (40-50 discuss w/ pt, all >50):UTD, due in March    - Osteoporosis (all>65, sooner if risk factors):N/A   - Lung Ca. Screening (50-80 if >20 pack yrs current or quit <15 yrs):N/A   - Colon Ca. Screening (age >45): N/A   - Hep. C, HIV: Negative       2. Type 2 diabetes mellitus without complication, without long-term current use of insulin  Overview:  Ozempic - nausea/vomiting  Jardiance - too expensive     Assessment & Plan:  Most recent A1c was 5.5, goal A1c <7.0%   Continue Metformin   Stop Ozempic due to side effects.   Skip dose on Sunday evening, then next Friday (5/2) start Mounjaro 2.5mg at night   Decrease Amaryl to 2mg daily  Continue ACE-I and Statin   Continue ADA diet, Counseled on importance of diet & weight loss       Orders:  -     tirzepatide (MOUNJARO) 5 mg/0.5 mL PnIj; Inject 5 mg into the skin every 7 days.  Dispense: 2 mL; Refill: 0  -     glimepiride (AMARYL) 2 MG tablet; Take 1 tablet (2 mg total) by mouth before breakfast.  Dispense: 90 tablet; Refill: 3  -     rosuvastatin (CRESTOR) 5 MG tablet; Take 1 tablet (5 mg total) by mouth once daily.  Dispense: 90 tablet; Refill: 3    3. Nausea  and vomiting, unspecified vomiting type  -     ondansetron (ZOFRAN-ODT) 4 MG TbDL; Place 1-2 tablet(s) under the tongue as needed every 8 hours for nausea.  Dispense: 60 tablet; Refill: 1    4. Primary hypertension  Assessment & Plan:  Today's BP WNL  Continue Losartan, HCTZ  Counseled on low sodium diet, exercise, tobacco avoidance, and limiting alcohol intake   Pt. Has home BP cuff, instructed to measure home BP and report abnormal values       5. Morbid obesity  Assessment & Plan:  Encouraged healthy lifestyle/diet modifications   Exercise 3-5x a week (>30 minutes, including a variety of cardio, weightbearing and lifting exercises)   Eat a well balanced diet comprised of fruit/vegetables, lean protein, and complex carbs  Stopped Ozempic due to side effects   Start Mounjaro for both glycemic and weight loss control      Follow up in about 3 months (around 7/24/2025) for Diabetes.

## 2025-04-24 NOTE — ASSESSMENT & PLAN NOTE
Most recent A1c was 5.5, goal A1c <7.0%   Continue Metformin   Stop Ozempic due to side effects.   Skip dose on Sunday evening, then next Friday (5/2) start Mounjaro 2.5mg at night   Decrease Amaryl to 2mg daily  Continue ACE-I and Statin   Continue ADA diet, Counseled on importance of diet & weight loss

## 2025-04-24 NOTE — ASSESSMENT & PLAN NOTE
Encouraged healthy lifestyle/diet modifications   Exercise 3-5x a week (>30 minutes, including a variety of cardio, weightbearing and lifting exercises)   Eat a well balanced diet comprised of fruit/vegetables, lean protein, and complex carbs  Stopped Ozempic due to side effects   Start Mounjaro for both glycemic and weight loss control

## 2025-05-29 ENCOUNTER — TELEPHONE (OUTPATIENT)
Dept: PRIMARY CARE CLINIC | Facility: CLINIC | Age: 45
End: 2025-05-29
Payer: COMMERCIAL

## 2025-05-29 DIAGNOSIS — E11.9 TYPE 2 DIABETES MELLITUS WITHOUT COMPLICATION, WITHOUT LONG-TERM CURRENT USE OF INSULIN: Primary | Chronic | ICD-10-CM

## 2025-05-29 RX ORDER — SEMAGLUTIDE 0.68 MG/ML
0.5 INJECTION, SOLUTION SUBCUTANEOUS
Qty: 3 ML | Refills: 0 | Status: SHIPPED | OUTPATIENT
Start: 2025-05-29

## 2025-05-29 NOTE — TELEPHONE ENCOUNTER
Patient stated she can't not afford the manjouro due, would like to switch back to ozempic. When asked pt stated her insurance stated she still has to pay some of her deductible which is why the medication is still $800 dollars.   Will alert provider.    Copied from CRM #[id number]. Topic: Medications - Medication Question  >> May 28, 2025 11:24 AM Esteban wrote:  .Who Called: Elsie Lang    Caller is requesting assistance/information from provider's office.    Symptoms (please be specific): n/a   How long has patient had these symptoms:  n/a  List of preferred pharmacies on file (remove unneeded): [unfilled]  If different, enter pharmacy into here including location and phone number: n/a      Preferred Method of Contact: Phone Call  Patient's Preferred Phone Number on File: [phone]   Best Call Back Number, if different:  Additional Information: pt called requesting to speak with nurse in regards to medication mounjaro  due to cost

## 2025-07-17 ENCOUNTER — TELEPHONE (OUTPATIENT)
Dept: PRIMARY CARE CLINIC | Facility: CLINIC | Age: 45
End: 2025-07-17
Payer: COMMERCIAL

## 2025-07-21 DIAGNOSIS — R11.2 NAUSEA AND VOMITING, UNSPECIFIED VOMITING TYPE: ICD-10-CM

## 2025-07-21 RX ORDER — ONDANSETRON 4 MG/1
TABLET, ORALLY DISINTEGRATING ORAL
Qty: 60 TABLET | Refills: 0 | Status: SHIPPED | OUTPATIENT
Start: 2025-07-21

## 2025-07-25 ENCOUNTER — OFFICE VISIT (OUTPATIENT)
Dept: PRIMARY CARE CLINIC | Facility: CLINIC | Age: 45
End: 2025-07-25
Payer: COMMERCIAL

## 2025-07-25 VITALS
TEMPERATURE: 98 F | SYSTOLIC BLOOD PRESSURE: 116 MMHG | DIASTOLIC BLOOD PRESSURE: 78 MMHG | WEIGHT: 214 LBS | HEIGHT: 60 IN | BODY MASS INDEX: 42.01 KG/M2 | HEART RATE: 80 BPM | OXYGEN SATURATION: 96 %

## 2025-07-25 DIAGNOSIS — I10 PRIMARY HYPERTENSION: Chronic | ICD-10-CM

## 2025-07-25 DIAGNOSIS — E11.9 TYPE 2 DIABETES MELLITUS WITHOUT COMPLICATION, WITHOUT LONG-TERM CURRENT USE OF INSULIN: Primary | Chronic | ICD-10-CM

## 2025-07-25 DIAGNOSIS — F41.1 GENERALIZED ANXIETY DISORDER: Chronic | ICD-10-CM

## 2025-07-25 RX ORDER — LOSARTAN POTASSIUM 50 MG/1
50 TABLET ORAL DAILY
Qty: 90 TABLET | Refills: 3 | Status: SHIPPED | OUTPATIENT
Start: 2025-07-25

## 2025-07-25 RX ORDER — ESCITALOPRAM OXALATE 10 MG/1
10 TABLET ORAL DAILY
Qty: 90 TABLET | Refills: 1 | Status: SHIPPED | OUTPATIENT
Start: 2025-07-25

## 2025-07-25 RX ORDER — HYDROCHLOROTHIAZIDE 25 MG/1
25 TABLET ORAL DAILY
Qty: 90 TABLET | Refills: 3 | Status: SHIPPED | OUTPATIENT
Start: 2025-07-25 | End: 2026-07-25

## 2025-07-25 RX ORDER — METFORMIN HYDROCHLORIDE 500 MG/1
1000 TABLET, EXTENDED RELEASE ORAL EVERY MORNING
Qty: 180 TABLET | Refills: 3 | Status: SHIPPED | OUTPATIENT
Start: 2025-07-25

## 2025-07-25 NOTE — PROGRESS NOTES
Subjective:       Patient ID: Elsie Lang is a 44 y.o. female.    -------------------------------------    Anxiety    Diabetes mellitus, type 2    GERD (gastroesophageal reflux disease)    Hypertension      Chief Complaint: Diabetes (F/u of diabetes. She notes having the shakes from hypoglycemia roughly 2 times in the past month. Notes the ozempic is expensive and requesting the semaglutide coumpound that she orders online, which helps.and does not cause her nausea like the ozempic)    History of Present Illness    CHIEF COMPLAINT:  Patient presents today for diabetes and blood pressure follow-up.    DIABETES:  She reports improved diabetes management with A1C decreasing from 10.2 to 5.3. She has experienced occasional hypoglycemic episodes, occurring twice recently. She is currently taking Metformin 1000 mg daily, Amaryl 2 mg in the morning, and compounded Semaglutide from [place]. She notes minimal side effects with the compounded Semaglutide, particularly reduced nausea compared to previous medication. She denies significant diabetes-related symptoms or complications.    WEIGHT MANAGEMENT:  She has lost 43 lbs since January, currently weighing 214 lbs, dropping from approximately 250 lbs. She is making significant progress in her weight loss journey with continued motivation.    HYPERTENSION:  She is taking Losartan 50 mg and hydrochlorothiazide 12.5 mg for blood pressure management with no reported concerns or side effects.    ANXIETY:  She is currently taking Lexapro 10 mg for anxiety and reports the medication is working well with no side effects.        Review of Systems   Constitutional:  Negative for chills and fever.   Respiratory:  Negative for cough.    Cardiovascular:  Negative for chest pain.   Gastrointestinal:  Negative for abdominal pain, nausea and vomiting.   Genitourinary:  Negative for dysuria and hematuria.   Psychiatric/Behavioral:  The patient is not nervous/anxious.           Objective:      Physical Exam  Vitals and nursing note reviewed.   Constitutional:       General: She is not in acute distress.     Appearance: Normal appearance. She is not ill-appearing.   HENT:      Head: Normocephalic.      Nose: Nose normal. No rhinorrhea.      Mouth/Throat:      Mouth: Mucous membranes are moist.   Eyes:      General: No scleral icterus.     Conjunctiva/sclera: Conjunctivae normal.   Cardiovascular:      Rate and Rhythm: Normal rate and regular rhythm.   Pulmonary:      Effort: Pulmonary effort is normal. No respiratory distress.   Musculoskeletal:         General: No deformity.   Skin:     General: Skin is warm and dry.      Coloration: Skin is not jaundiced.   Neurological:      Mental Status: She is alert and oriented to person, place, and time. Mental status is at baseline.      Gait: Gait normal.   Psychiatric:         Mood and Affect: Mood normal.         Behavior: Behavior normal.         Assessment & Plan:   Assessment & Plan    I10 Primary hypertension  F41.1 Generalized anxiety disorder  E11.9 Type 2 diabetes mellitus without complication, without long-term current use of insulin  Z79.84 Long term (current) use of oral hypoglycemic drugs    IMPRESSION:  Diabetes now well-controlled, with A1C improved from 10.2 to 5.3.  Weight loss from 257 lbs to 214 lbs.  Adjusted diabetes medications due to reported hypoglycemic episodes and excellent A1C.  Considered compound semaglutide's efficacy and tolerability in weight loss regimen.    PRIMARY HYPERTENSION:  - Continue Losartan and HCTZ. Well controlled, sent refill    GENERALIZED ANXIETY DISORDER:  - Continue Lexapro. Well controlled, sent refill    TYPE 2 DIABETES MELLITUS WITHOUT COMPLICATION, WITHOUT LONG-TERM CURRENT USE OF INSULIN, LONG TERM (CURRENT) USE OF ORAL HYPOGLYCEMIC DRUGS:  - Continue compounded Semaglutide   - Discussed the importance of setting smaller, attainable goals in weight loss and diabetes management journey.  -  Advised patient about lack of FDA regulation for compound medications and potential variability in ingredients.  - Patient instructed to monitor for any changes in symptoms or efficacy with the medication and to contact the office if any issues arise.  - Follow up scheduled in 3 months to reassess A1C and evaluate for medication adjustments.  - Patient has experienced lower glucose levels twice, indicating hypoglycemic episodes.  - A1C is currently 5.3, showing excellent diabetes control with significant improvement from 10.2 in January to 7.0, 5.5, and now 5.3.  - Due to these hypoglycemic episodes, Amaryl 2 mg at breakfast will be discontinued.  - If glucose remains stable after stopping Amaryl, recommended decreasing Metformin to 500 mg daily (from current 1000 mg daily).  - Patient instructed to contact the office if experiencing any further hypoglycemic episodes.        Follow up in about 3 months (around 10/25/2025) for Diabetes, HTN, Lab Results. In addition to their scheduled follow up, the patient has also been instructed to follow up on as needed basis.    This note was generated with the assistance of ambient listening technology. Verbal consent was obtained by the patient and accompanying visitor(s) for the recording of patient appointment to facilitate this note. I attest to having reviewed and edited the generated note for accuracy, though some syntax or spelling errors may persist. Please contact the author of this note for any clarification.